# Patient Record
Sex: MALE | URBAN - METROPOLITAN AREA
[De-identification: names, ages, dates, MRNs, and addresses within clinical notes are randomized per-mention and may not be internally consistent; named-entity substitution may affect disease eponyms.]

---

## 2018-04-03 ENCOUNTER — HOSPITAL ENCOUNTER (EMERGENCY)
Facility: CLINIC | Age: 18
Discharge: HOME | End: 2018-04-03
Attending: FAMILY MEDICINE | Admitting: FAMILY MEDICINE

## 2018-04-03 VITALS
HEART RATE: 104 BPM | SYSTOLIC BLOOD PRESSURE: 113 MMHG | DIASTOLIC BLOOD PRESSURE: 64 MMHG | OXYGEN SATURATION: 98 % | TEMPERATURE: 100.7 F | RESPIRATION RATE: 16 BRPM | WEIGHT: 139.6 LBS

## 2018-04-03 DIAGNOSIS — J03.00 ACUTE STREPTOCOCCAL TONSILLITIS, NOT SPECIFIED AS RECURRENT OR NOT: ICD-10-CM

## 2018-04-03 LAB
ALBUMIN SERPL-MCNC: 4 G/DL (ref 3.4–5)
ALP SERPL-CCNC: 89 U/L (ref 65–260)
ALT SERPL W P-5'-P-CCNC: 34 U/L (ref 0–50)
ANION GAP SERPL CALCULATED.3IONS-SCNC: 10 MMOL/L (ref 3–14)
AST SERPL W P-5'-P-CCNC: 19 U/L (ref 0–35)
BASOPHILS # BLD AUTO: 0 10E9/L (ref 0–0.2)
BASOPHILS NFR BLD AUTO: 0.1 %
BILIRUB SERPL-MCNC: 0.6 MG/DL (ref 0.2–1.3)
BUN SERPL-MCNC: 14 MG/DL (ref 7–30)
CALCIUM SERPL-MCNC: 8.6 MG/DL (ref 8.5–10.1)
CHLORIDE SERPL-SCNC: 105 MMOL/L (ref 98–110)
CO2 BLDCOV-SCNC: 26 MMOL/L (ref 21–28)
CO2 SERPL-SCNC: 26 MMOL/L (ref 20–32)
CREAT SERPL-MCNC: 0.85 MG/DL (ref 0.5–1)
DEPRECATED S PYO AG THROAT QL EIA: ABNORMAL
DIFFERENTIAL METHOD BLD: ABNORMAL
EOSINOPHIL # BLD AUTO: 0 10E9/L (ref 0–0.7)
EOSINOPHIL NFR BLD AUTO: 0.1 %
ERYTHROCYTE [DISTWIDTH] IN BLOOD BY AUTOMATED COUNT: 12.9 % (ref 10–15)
GFR SERPL CREATININE-BSD FRML MDRD: >90 ML/MIN/1.7M2
GLUCOSE SERPL-MCNC: 98 MG/DL (ref 70–99)
HCT VFR BLD AUTO: 42.3 % (ref 40–53)
HETEROPH AB SER QL: NEGATIVE
HGB BLD-MCNC: 14.7 G/DL (ref 13.3–17.7)
IMM GRANULOCYTES # BLD: 0.1 10E9/L (ref 0–0.4)
IMM GRANULOCYTES NFR BLD: 0.3 %
LACTATE BLD-SCNC: 0.8 MMOL/L (ref 0.7–2.1)
LYMPHOCYTES # BLD AUTO: 1.2 10E9/L (ref 0.8–5.3)
LYMPHOCYTES NFR BLD AUTO: 5.3 %
MCH RBC QN AUTO: 29 PG (ref 26.5–33)
MCHC RBC AUTO-ENTMCNC: 34.8 G/DL (ref 31.5–36.5)
MCV RBC AUTO: 83 FL (ref 78–100)
MONOCYTES # BLD AUTO: 1.8 10E9/L (ref 0–1.3)
MONOCYTES NFR BLD AUTO: 7.9 %
NEUTROPHILS # BLD AUTO: 19.2 10E9/L (ref 1.6–8.3)
NEUTROPHILS NFR BLD AUTO: 86.3 %
NRBC # BLD AUTO: 0 10*3/UL
NRBC BLD AUTO-RTO: 0 /100
PCO2 BLDV: 37 MM HG (ref 40–50)
PH BLDV: 7.45 PH (ref 7.32–7.43)
PLATELET # BLD AUTO: 194 10E9/L (ref 150–450)
PO2 BLDV: 34 MM HG (ref 25–47)
POTASSIUM SERPL-SCNC: 3.3 MMOL/L (ref 3.4–5.3)
PROT SERPL-MCNC: 7.8 G/DL (ref 6.8–8.8)
RBC # BLD AUTO: 5.07 10E12/L (ref 4.4–5.9)
SAO2 % BLDV FROM PO2: 68 %
SODIUM SERPL-SCNC: 141 MMOL/L (ref 133–144)
SPECIMEN SOURCE: ABNORMAL
WBC # BLD AUTO: 22.3 10E9/L (ref 4–11)

## 2018-04-03 PROCEDURE — 87880 STREP A ASSAY W/OPTIC: CPT | Performed by: FAMILY MEDICINE

## 2018-04-03 PROCEDURE — 80053 COMPREHEN METABOLIC PANEL: CPT | Performed by: FAMILY MEDICINE

## 2018-04-03 PROCEDURE — 99284 EMERGENCY DEPT VISIT MOD MDM: CPT | Mod: 25 | Performed by: FAMILY MEDICINE

## 2018-04-03 PROCEDURE — 96374 THER/PROPH/DIAG INJ IV PUSH: CPT | Performed by: FAMILY MEDICINE

## 2018-04-03 PROCEDURE — 25000128 H RX IP 250 OP 636: Performed by: FAMILY MEDICINE

## 2018-04-03 PROCEDURE — 96375 TX/PRO/DX INJ NEW DRUG ADDON: CPT | Performed by: FAMILY MEDICINE

## 2018-04-03 PROCEDURE — 82803 BLOOD GASES ANY COMBINATION: CPT

## 2018-04-03 PROCEDURE — 83605 ASSAY OF LACTIC ACID: CPT

## 2018-04-03 PROCEDURE — 99284 EMERGENCY DEPT VISIT MOD MDM: CPT | Mod: Z6 | Performed by: FAMILY MEDICINE

## 2018-04-03 PROCEDURE — 96361 HYDRATE IV INFUSION ADD-ON: CPT | Performed by: FAMILY MEDICINE

## 2018-04-03 PROCEDURE — 25000128 H RX IP 250 OP 636: Performed by: STUDENT IN AN ORGANIZED HEALTH CARE EDUCATION/TRAINING PROGRAM

## 2018-04-03 PROCEDURE — 85025 COMPLETE CBC W/AUTO DIFF WBC: CPT | Performed by: FAMILY MEDICINE

## 2018-04-03 PROCEDURE — 86308 HETEROPHILE ANTIBODY SCREEN: CPT | Performed by: FAMILY MEDICINE

## 2018-04-03 RX ORDER — DEXAMETHASONE SODIUM PHOSPHATE 10 MG/ML
10 INJECTION, SOLUTION INTRAMUSCULAR; INTRAVENOUS ONCE
Status: COMPLETED | OUTPATIENT
Start: 2018-04-03 | End: 2018-04-03

## 2018-04-03 RX ADMIN — PENICILLIN G BENZATHINE 1.2 MILLION UNITS: 1200000 INJECTION, SUSPENSION INTRAMUSCULAR at 17:50

## 2018-04-03 RX ADMIN — SODIUM CHLORIDE 1000 ML: 9 INJECTION, SOLUTION INTRAVENOUS at 16:09

## 2018-04-03 RX ADMIN — DEXAMETHASONE SODIUM PHOSPHATE 10 MG: 10 INJECTION, SOLUTION INTRAMUSCULAR; INTRAVENOUS at 17:00

## 2018-04-03 ASSESSMENT — ENCOUNTER SYMPTOMS
DIARRHEA: 0
PSYCHIATRIC NEGATIVE: 1
NUMBNESS: 0
DIZZINESS: 0
WOUND: 0
NECK STIFFNESS: 0
VOMITING: 1
RHINORRHEA: 0
WHEEZING: 0
FEVER: 1
LIGHT-HEADEDNESS: 0
CHILLS: 1
PALPITATIONS: 0
ACTIVITY CHANGE: 1
HEADACHES: 0
ABDOMINAL PAIN: 1
TROUBLE SWALLOWING: 1
MYALGIAS: 1
HEMATURIA: 0
FLANK PAIN: 0
CONSTIPATION: 0
DYSURIA: 0
NECK PAIN: 0
SEIZURES: 0
SORE THROAT: 1
FATIGUE: 1
ARTHRALGIAS: 1
TREMORS: 0
ENDOCRINE NEGATIVE: 1
PHOTOPHOBIA: 0
EYE PAIN: 0
BACK PAIN: 0
WEAKNESS: 0
DIAPHORESIS: 0
COLOR CHANGE: 0
BLOOD IN STOOL: 0
SINUS PAIN: 0
APPETITE CHANGE: 0
SHORTNESS OF BREATH: 0
VOICE CHANGE: 0
NAUSEA: 1
HEMATOLOGIC/LYMPHATIC NEGATIVE: 1
COUGH: 1

## 2018-04-03 NOTE — ED PROVIDER NOTES
History     Chief Complaint   Patient presents with     Fever     Fever, sore throat and headache x1 month, worse since yesterday     Pharyngitis     Patient is a 17 year old male presenting with fever and sore throat.   Fever   Associated symptoms: chest pain, chills, cough, myalgias, nausea, sore throat and vomiting    Associated symptoms: no congestion, no diarrhea, no dysuria, no ear pain, no headaches, no rash and no rhinorrhea    Pharyngitis   Associated symptoms: abdominal pain, chest pain, chills, cough, fever and trouble swallowing    Associated symptoms: no drooling, no ear pain, no epistaxis, no headaches, no neck stiffness, no postnasal drip, no rash, no rhinorrhea, no shortness of breath and no voice change      Rolando Bartlett is a 17 year old male with no significant past medical history who presents for evaluation of sore throat, swollen tonsils, fevers, chills, as well as a mild productive cough and nausea/vomiting.  Patient reports approximately 1 month ago he developed a sore throat noticed that his bilateral tonsils are swollen.  He felt well otherwise, and was able to go about his ADLs without any limitations.  However for the last few days, his symptoms have increased in severity.  He has begun to feel fevers and chills, and today had to leave work early due to feeling unwell.  When he got home he checked his temperature which was 40 C.  For the last 24 hours, he has been able to drink fluids but has been unable to swallow food due to fear of choking has enlarged tonsils.  He has felt nauseous for the last few days, and this morning did have one episode of nonbloody emesis.  He is felt increasingly fatigued, with decreased energy, and he does think that he is becoming dehydrated.  He has developed a cough which is occasionally productive of a minimal amount of yellowish sputum over the last few days.  He is also had some point tenderness over the left chest associated with this cough, which comes  and goes, does not radiate into his neck or extremities, and it is not associated with diaphoresis.  Denies any recent sick contacts at home or work.  He lives at home with his uncle, wife and young child.    History reviewed. No pertinent past medical history.    No past surgical history on file.    No family history on file.    Social History   Substance Use Topics     Smoking status: Never Smoker     Smokeless tobacco: Never Used     Alcohol use No     No current facility-administered medications for this encounter.      Current Outpatient Prescriptions   Medication     Acetaminophen (TYLENOL PO)     IBUPROFEN PO     Results for orders placed or performed during the hospital encounter of 04/03/18 (from the past 24 hour(s))   Comprehensive metabolic panel   Result Value Ref Range    Sodium 141 133 - 144 mmol/L    Potassium 3.3 (L) 3.4 - 5.3 mmol/L    Chloride 105 98 - 110 mmol/L    Carbon Dioxide 26 20 - 32 mmol/L    Anion Gap 10 3 - 14 mmol/L    Glucose 98 70 - 99 mg/dL    Urea Nitrogen 14 7 - 30 mg/dL    Creatinine 0.85 0.50 - 1.00 mg/dL    GFR Estimate >90 >60 mL/min/1.7m2    GFR Estimate If Black >90 >60 mL/min/1.7m2    Calcium 8.6 8.5 - 10.1 mg/dL    Bilirubin Total 0.6 0.2 - 1.3 mg/dL    Albumin 4.0 3.4 - 5.0 g/dL    Protein Total 7.8 6.8 - 8.8 g/dL    Alkaline Phosphatase 89 65 - 260 U/L    ALT 34 0 - 50 U/L    AST 19 0 - 35 U/L   CBC with platelets differential   Result Value Ref Range    WBC 22.3 (H) 4.0 - 11.0 10e9/L    RBC Count 5.07 4.4 - 5.9 10e12/L    Hemoglobin 14.7 13.3 - 17.7 g/dL    Hematocrit 42.3 40.0 - 53.0 %    MCV 83 78 - 100 fl    MCH 29.0 26.5 - 33.0 pg    MCHC 34.8 31.5 - 36.5 g/dL    RDW 12.9 10.0 - 15.0 %    Platelet Count 194 150 - 450 10e9/L    Diff Method Automated Method     % Neutrophils 86.3 %    % Lymphocytes 5.3 %    % Monocytes 7.9 %    % Eosinophils 0.1 %    % Basophils 0.1 %    % Immature Granulocytes 0.3 %    Nucleated RBCs 0 0 /100    Absolute Neutrophil 19.2 (H) 1.6 - 8.3  10e9/L    Absolute Lymphocytes 1.2 0.8 - 5.3 10e9/L    Absolute Monocytes 1.8 (H) 0.0 - 1.3 10e9/L    Absolute Eosinophils 0.0 0.0 - 0.7 10e9/L    Absolute Basophils 0.0 0.0 - 0.2 10e9/L    Abs Immature Granulocytes 0.1 0 - 0.4 10e9/L    Absolute Nucleated RBC 0.0    Mononucleosis screen   Result Value Ref Range    Mononucleosis Screen Negative NEG^Negative   ISTAT gases lactate tomy POCT   Result Value Ref Range    Ph Venous 7.45 (H) 7.32 - 7.43 pH    PCO2 Venous 37 (L) 40 - 50 mm Hg    PO2 Venous 34 25 - 47 mm Hg    Bicarbonate Venous 26 21 - 28 mmol/L    O2 Sat Venous 68 %    Lactic Acid 0.8 0.7 - 2.1 mmol/L   Rapid strep screen   Result Value Ref Range    Specimen Description Throat     Rapid Strep A Screen (A)      POSITIVE: Group A Streptococcal antigen detected by immunoassay.         I have reviewed the Medications, Allergies, Past Medical and Surgical History, and Social History in the Epic system.    Review of Systems   Constitutional: Positive for activity change, chills, fatigue and fever. Negative for appetite change and diaphoresis.   HENT: Positive for sore throat and trouble swallowing. Negative for congestion, drooling, ear pain, mouth sores, nosebleeds, postnasal drip, rhinorrhea, sinus pain and voice change.    Eyes: Negative for photophobia, pain and visual disturbance.   Respiratory: Positive for cough. Negative for shortness of breath and wheezing.         Cough occasionally productive of yellowish sputum.   Cardiovascular: Positive for chest pain. Negative for palpitations and leg swelling.        Point tenderness over the left chest which is nonradiating.   Gastrointestinal: Positive for abdominal pain, nausea and vomiting. Negative for blood in stool, constipation and diarrhea.   Endocrine: Negative.  Negative for cold intolerance, heat intolerance and polyuria.   Genitourinary: Negative for dysuria, flank pain, hematuria and urgency.   Musculoskeletal: Positive for arthralgias and myalgias.  Negative for back pain, neck pain and neck stiffness.   Skin: Negative for color change, pallor, rash and wound.   Neurological: Negative for dizziness, tremors, seizures, weakness, light-headedness, numbness and headaches.   Hematological: Negative.    Psychiatric/Behavioral: Negative.    All other systems reviewed and are negative.      Physical Exam   BP: 108/60  Pulse: 106  Temp: 101.6  F (38.7  C)  Resp: 18  Weight: 63.3 kg (139 lb 9.6 oz)  SpO2: 96 %      Physical Exam   Constitutional: He is oriented to person, place, and time. He appears well-developed and well-nourished. No distress.   Mildly uncomfortable appearing male lying in bed, nontoxic.   HENT:   Head: Normocephalic and atraumatic.   Oropharyngeal mucous membranes are dry.  There is significant bilateral tonsillar hypertrophy, with erythema and exudates.  Posterior oropharynx is patent.  Palpation of the submandibular space is nontender and nonswollen.  He has palpable lymphadenopathy in the submandibular region, as well as the anterior cervical chains bilaterally.  Able to fully open his mouth.   Eyes: Conjunctivae and EOM are normal. Pupils are equal, round, and reactive to light. Right eye exhibits no discharge. Left eye exhibits no discharge. No scleral icterus.   Neck: Normal range of motion. Neck supple.   Cardiovascular: Normal rate, regular rhythm, normal heart sounds and intact distal pulses.  Exam reveals no gallop and no friction rub.    No murmur heard.  Pulmonary/Chest: Effort normal and breath sounds normal. No respiratory distress. He has no wheezes. He has no rales. He exhibits no tenderness.   Abdominal: Soft. Bowel sounds are normal. He exhibits no distension and no mass. There is no tenderness. There is no rebound and no guarding.   Musculoskeletal: Normal range of motion. He exhibits no edema.   Neurological: He is alert and oriented to person, place, and time. He has normal reflexes. No cranial nerve deficit. He exhibits normal  muscle tone.   Skin: Skin is warm. No rash noted. No erythema. No pallor.   Mildly diaphoretic.   Psychiatric: He has a normal mood and affect. His behavior is normal. Judgment and thought content normal.   Nursing note and vitals reviewed.      ED Course     ED Course      Sepsis order set was triggered on arrival.  Droplet precautions, peripheral IV placed, placed on pulse ox and continuous cardiac monitoring, and CBC, CMP, i-STAT gases obtained.  I-STAT lactate 0.8, venous pH 7.45, venous PCO2 37, venous PCO2 34, venous bicarb 26, venous O2 sat 68.    1515 evaluated by resident.  1530 additional labs including a mononucleosis screen, and rapid strep swab obtained.  Administered a 1 L normal saline bolus.  1720 Rapid strep positive for strep A.  Treated with single-dose IM pen G1 0.2 million units.  Administered 10 mg IV Decadron for tonsillar edema.  He tolerated these medications well.    Procedures none        Labs Ordered and Resulted from Time of ED Arrival Up to the Time of Departure from the ED   COMPREHENSIVE METABOLIC PANEL - Abnormal; Notable for the following:        Result Value    Potassium 3.3 (*)     All other components within normal limits   CBC WITH PLATELETS DIFFERENTIAL - Abnormal; Notable for the following:     WBC 22.3 (*)     Absolute Neutrophil 19.2 (*)     Absolute Monocytes 1.8 (*)     All other components within normal limits   ISTAT  GASES LACTATE PARVIN POCT - Abnormal; Notable for the following:     Ph Venous 7.45 (*)     PCO2 Venous 37 (*)     All other components within normal limits   RAPID STREP SCREEN - Abnormal; Notable for the following:     Rapid Strep A Screen   (*)     Value: POSITIVE: Group A Streptococcal antigen detected by immunoassay.    All other components within normal limits   MONONUCLEOSIS SCREEN   PERIPHERAL IV CATHETER   PULSE OXIMETRY NURSING   CARDIAC CONTINUOUS MONITORING   NURSING DRAW AND HOLD   ISTAT CG4 GASES LACTATE PARVIN NURSING POCT            Assessments &  Plan (with Medical Decision Making)   17-year-old otherwise healthy male presents for evaluation of 1 month of sore throat, swollen tonsils, and more recently a few days of worsening subjective fevers and chills, as well as mildly productive cough, and nausea and vomiting.  Differential diagnosis includes Guillermo's angina, retropharyngeal abscess, peritonsillar abscess, infectious mononucleosis, strep pharyngitis, viral pharyngitis, viral URI.    On exam, patient is mildly uncomfortable, groaning throughout exam, but nontoxic-appearing.  He is febrile to 101.4, mildly tachycardic to 106, and blood pressure of 108/60.  He is breathing comfortably on room air with sats of 96%.  Her mucous membranes are dry.  He has significant bilateral tonsillar hypertrophy, as well as tonsillar erythema, and mild exudates.  He has bilateral lymphadenopathy along the submandibular region as well as the anterior cervical chain.  Palpation of the submandibular space is soft and nontender.  Cranial nerves are intact.  Pulmonary and cardiac examination is unremarkable.  Abdomen is benign, soft, nontender to palpation, no organomegaly.  Skin is warm and wet.    Patient did trigger the sepsis protocol on arrival to the emergency department.  He was placed on droplet precautions, a peripheral line was placed, he was placed on pulse ox and continuous cardiac monitoring, and labs including CMP, CBC, lactate, and i-STAT gases were obtained.  Following physical exam, a Monospot and rapid strep were also ordered.  CMP significant for mild hypokalemia with potassium of 3.3.  Electrolytes otherwise within normal limits.  Creatinine 0.85.  LFTs within normal limits.  Notably, CBC demonstrated leukocytosis with white blood cell count of 22.3.  His ANC was 19.2.  His absolute monocytes were also elevated at 1.8.  Hemoglobin 14.7.  His Monospot was negative.  His rapid strep screen was positive for strep A.     Based on this workup, suspect patient is  experiencing acute strep pharyngitis.  Low suspicion for peritonsillar or retropharyngeal abscess given lack of clinical signs or symptoms.  Given the degree of his bilateral tonsillar hypertrophy he was given a dose of IV Decadron in the emergency department.  At no point in the ED course that he experiencing respiratory distress.  Patient opted to treat strep throat with a single dose of IM pen G.  He reported having this medication in the past, and denied any allergies to penicillin.  He tolerated this medication well.  Discussed concerning signs or symptoms that would warrant return to the emergency department.  He was informed to follow-up with primary care only as needed.  He was discharged home.       I have reviewed the nursing notes.    I have reviewed the findings, diagnosis, plan and need for follow up with the patient.    New Prescriptions    No medications on file       Final diagnoses:   Acute streptococcal tonsillitis, not specified as recurrent or not       4/3/2018   [facility], [place], EMERGENCY DEPARTMENT  This data collected with the Resident working in the Emergency Department.  Patient was seen and evaluated by myself and I repeated the history and physical exam with the patient.  The plan of care was discussed with them.  The key portions of the note including the entire assessment and plan reflect my documentation.           Ra Marrero MD  04/04/18 [id number]

## 2018-04-03 NOTE — ED AVS SNAPSHOT
[facility], Emergency Department    [address]    [place] [address]    Phone:  [phone]    Fax:  [phone]                                       Rolando Bartlett   MRN: [id number]    Department:  [facility], Emergency Department   Date of Visit:  4/3/2018           Patient Information     Date Of Birth          2000        Your diagnoses for this visit were:     Acute streptococcal tonsillitis, not specified as recurrent or not        You were seen by Ra Marrero MD.      Follow-up Information     Please follow up.    Why:  As needed with primary care        Discharge Instructions       Evaluated for sore throat and swollen tonsils.  You were found to have acute strep throat.  You were given a dose of steroids in your IV to decrease the swelling in her tonsils.  You were given 1 shot of penicillin to treat your bacterial strep infection.    1.  No changes were made to her outpatient medications.  2.  Follow-up with your primary care doctor as needed if you are not feeling better in 1-2 weeks.  3.  If you experience new or worsening fevers, inability to swallow or breathe, or generally feeling worse, return to the emergency department for further evaluation.    Discharge References/Attachments     PHARYNGITIS, STREP (CONFIRMED) (Armenian)    URI, VIRAL W/ WHEEZING (ADULT) (Armenian)      24 Hour Appointment Hotline       To make an appointment at any [facility], call [phone] ([phone]). If you don't have a family doctor or clinic, we will help you find one. [facility] clinics are conveniently located to serve the needs of you and your family.             Review of your medicines      Our records show that you are taking the medicines listed below. If these are incorrect, please call your family doctor or clinic.        Dose / Directions Last dose taken    IBUPROFEN PO        Refills:  0        TYLENOL PO        Refills:  0                Procedures and tests performed during  "your visit     CBC with platelets differential    Cardiac Continuous Monitoring    Comprehensive metabolic panel    Draw and hold    ISTAT CG4 gases lactate tomy nursing POCT    ISTAT gases lactate tomy POCT    Mononucleosis screen    Peripheral IV: Standard    Pulse oximetry nursing    Rapid strep screen      Orders Needing Specimen Collection     None      Pending Results     No orders found from 2018 to 2018.            Pending Culture Results     No orders found from 2018 to 2018.            Pending Results Instructions     If you had any lab results that were not finalized at the time of your Discharge, you can call the ED Lab Result RN at [phone]. You will be contacted by this team for any positive Lab results or changes in treatment. The nurses are available 7 days a week from 10A to 6:30P.  You can leave a message 24 hours per day and they will return your call.        Thank you for choosing [facility]       Thank you for choosing [facility] for your care. Our goal is always to provide you with excellent care. Hearing back from our patients is one way we can continue to improve our services. Please take a few minutes to complete the written survey that you may receive in the mail after you visit with us. Thank you!        [employer] Information     [employer] lets you send messages to your doctor, view your test results, renew your prescriptions, schedule appointments and more. To sign up, go to [email or web address] . Click on \"Log in\" on the left side of the screen, which will take you to the Welcome page. Then click on \"Sign up Now\" on the right side of the page.     You will be asked to enter the access code listed below, as well as some personal information. Please follow the directions to create your username and password.     Your access code is: [email or web address]  Expires: 2018  5:44 PM     Your access code will  in 90 days. If you need help or a new code, please call your [facility] " [facility] or [phone].        Care EveryWhere ID     This is your Care EveryWhere ID. This could be used by other organizations to access your [facility] medical records  [id number]        Equal Access to Services     FRANCISCA ACOSTA : Mustapha Smith, wajoleenda luqadaha, qaybta kaalmada jaleel, vannessa meza. So [facility] [phone].    ATENCIÓN: Si habla español, tiene a sanches disposición servicios gratuitos de asistencia lingüística. Llame al [phone].    We comply with applicable federal civil rights laws and [place] laws. We do not discriminate on the basis of race, color, national origin, age, disability, sex, sexual orientation, or gender identity.            After Visit Summary       This is your record. Keep this with you and show to your community pharmacist(s) and doctor(s) at your next visit.

## 2018-04-03 NOTE — ED NOTES
Patient reports chills, fever, headache, and sore throat x1 month, worse last night.  Patient took tylenol and ibuprofen at 0700 today without relief.

## 2018-04-03 NOTE — ED AVS SNAPSHOT
[facility], [place], Emergency Department    [address]    [facility] [address]    Phone:  [phone]    Fax:  [phone]                                       Rolando Bartlett   MRN: [id number]    Department:  [facility], Emergency Department   Date of Visit:  4/3/2018           After Visit Summary Signature Page     I have received my discharge instructions, and my questions have been answered. I have discussed any challenges I see with this plan with the nurse or doctor.    ..........................................................................................................................................  Patient/Patient Representative Signature      ..........................................................................................................................................  Patient Representative Print Name and Relationship to Patient    ..................................................               ................................................  Date                                            Time    ..........................................................................................................................................  Reviewed by Signature/Title    ...................................................              ..............................................  Date                                                            Time

## 2018-04-03 NOTE — DISCHARGE INSTRUCTIONS
Evaluated for sore throat and swollen tonsils.  You were found to have acute strep throat.  You were given a dose of steroids in your IV to decrease the swelling in her tonsils.  You were given 1 shot of penicillin to treat your bacterial strep infection.    1.  No changes were made to her outpatient medications.  2.  Follow-up with your primary care doctor as needed if you are not feeling better in 1-2 weeks.  3.  If you experience new or worsening fevers, inability to swallow or breathe, or generally feeling worse, return to the emergency department for further evaluation.